# Patient Record
Sex: FEMALE | Race: BLACK OR AFRICAN AMERICAN
[De-identification: names, ages, dates, MRNs, and addresses within clinical notes are randomized per-mention and may not be internally consistent; named-entity substitution may affect disease eponyms.]

---

## 2020-08-29 ENCOUNTER — HOSPITAL ENCOUNTER (EMERGENCY)
Dept: HOSPITAL 56 - MW.ED | Age: 30
Discharge: HOME | End: 2020-08-29
Payer: COMMERCIAL

## 2020-08-29 VITALS — DIASTOLIC BLOOD PRESSURE: 88 MMHG | HEART RATE: 78 BPM | SYSTOLIC BLOOD PRESSURE: 140 MMHG

## 2020-08-29 DIAGNOSIS — O21.9: Primary | ICD-10-CM

## 2020-08-29 DIAGNOSIS — O10.911: ICD-10-CM

## 2020-08-29 DIAGNOSIS — Z79.899: ICD-10-CM

## 2020-08-29 DIAGNOSIS — Z91.013: ICD-10-CM

## 2020-08-29 LAB
BUN SERPL-MCNC: 11 MG/DL (ref 7–18)
CHLORIDE SERPL-SCNC: 101 MMOL/L (ref 98–107)
CO2 SERPL-SCNC: 25.1 MMOL/L (ref 21–32)
GLUCOSE SERPL-MCNC: 100 MG/DL (ref 74–106)
POTASSIUM SERPL-SCNC: 3.9 MMOL/L (ref 3.5–5.1)
SODIUM SERPL-SCNC: 136 MMOL/L (ref 136–145)

## 2020-08-29 PROCEDURE — 36415 COLL VENOUS BLD VENIPUNCTURE: CPT

## 2020-08-29 PROCEDURE — 80053 COMPREHEN METABOLIC PANEL: CPT

## 2020-08-29 PROCEDURE — 99284 EMERGENCY DEPT VISIT MOD MDM: CPT

## 2020-08-29 PROCEDURE — 96374 THER/PROPH/DIAG INJ IV PUSH: CPT

## 2020-08-29 PROCEDURE — 85025 COMPLETE CBC W/AUTO DIFF WBC: CPT

## 2020-08-29 PROCEDURE — 96361 HYDRATE IV INFUSION ADD-ON: CPT

## 2020-08-29 PROCEDURE — 81001 URINALYSIS AUTO W/SCOPE: CPT

## 2020-08-29 NOTE — EDM.PDOC
ED HPI GENERAL MEDICAL PROBLEM





- General


Chief Complaint: Gastrointestinal Problem


Stated Complaint: FEELS WEEK


Time Seen by Provider: 20 02:47


Source of Information: Reports: Patient


History Limitations: Reports: No Limitations





- History of Present Illness


INITIAL COMMENTS - FREE TEXT/NARRATIVE: 





29-year-old female with history of hypertension, A2, GA 4 weeks presents 

feeling nauseous and vomiting for 2 days.  She feels like she wants to pass out.

 She has had 6 episodes of nonbloody vomitus over the last 2 days.  She denies 

fever, chills, abdominal pain, vaginal bleeding, pelvic pain, dysuria, chest 

pain, shortness of breath, palpitations, back pain, sore throat, runny nose, 

headache.  She does admit to having chills.  She has yet to see [facility]

for her pregnancy.  She has not taking any medications for her vomiting.





ROS: A 10-point review of systems, other than pertinent positives and negatives 

as stated per HPI, is otherwise negative





Past medical history: No additional pertinent history


Past Surgical history: No additional pertinent history


Social history: No additional pertinent history


Family history: No additional pertinent history


________________________________________________________________________________





PHYSICAL EXAM





General: AOx4, GCS = 15, No distress


HEENT: dry mucous membrane


Neck: supple, no meningismus, no Kernig or Brudzinski


Cardiac: S1S2 RRR


Respiratory: CTAB, no crackles or rales, no wheezing


Abdomen: Soft, nontender, no rebound or guarding, nondistended, no pulsatile 

mass.


Back: nontender


Musculoskeletal: NVI distally, no deformity


Neuro: No focal deficits, CN 2 - 12 WNL.











- Related Data


                                    Allergies











Allergy/AdvReac Type Severity Reaction Status Date / Time


 


seafoods Allergy  Swelling Uncoded 20 03:08











Home Meds: 


                                    Home Meds





Acetaminophen [Tylenol Extra Strength] 500 mg PO Q4H PRN #30 tab 14 [Rx]


Losartan/Hydrochlorothiazide [Losartan-HCTZ 50-12.5 MG] 1 mg PO DAILY 20 

[History]


Ondansetron [Zofran ODT] 4 mg PO Q6H PRN #12 tab.dis 20 [Rx]











Past Medical History





- Past Health History


Medical/Surgical History: Denies Medical/Surgical History


Cardiovascular History: Reports: Hypertension





Social & Family History





- Tobacco Use


Smoking Status *Q: Never Smoker


Second Hand Smoke Exposure: No





- Caffeine Use


Caffeine Use: Reports: None





ED ROS GENERAL





- Review of Systems


Review Of Systems: Comprehensive ROS is negative, except as noted in HPI.





ED EXAM PREGNANCY





- Physical Exam


Exam: See Below (see dictation)





ED DELIVERY OF 





- Related Past History


Related Past History: HTN





Course





- Vital Signs


Last Recorded V/S: 


                                Last Vital Signs











Temp  97.8 F   20 03:05


 


Pulse  77   20 04:06


 


Resp  16   20 04:06


 


BP  140/80   20 04:06


 


Pulse Ox  98   20 04:06








                                        





Orthostatic Blood Pressure [     143/101


Standing]                        


Orthostatic Blood Pressure [     156/88


Sitting]                         


Orthostatic Blood Pressure [     147/97


Supine]                          











- Orders/Labs/Meds


Orders: 


                               Active Orders 24 hr











 Category Date Time Status


 


 Cardiac Monitoring [RC] .AS DIRECTED Care  20 02:48 Active


 


 Orthostatic Vital Signs [RC] ASDIRECTED Care  20 02:48 Active


 


 Pulse Oximetry [RC] ASDIRECTED Care  20 02:48 Active











Labs: 


                                Laboratory Tests











  20 Range/Units





  03:15 03:15 04:20 


 


WBC  14.12 H    (4.0-11.0)  K/uL


 


RBC  4.66    (4.30-5.90)  M/uL


 


Hgb  12.1    (12.0-16.0)  g/dL


 


Hct  33.8 L    (36.0-46.0)  %


 


MCV  72.5 L    (80.0-98.0)  fL


 


MCH  26.0 L    (27.0-32.0)  pg


 


MCHC  35.8    (31.0-37.0)  g/dL


 


RDW Std Deviation  42.6    (28.0-62.0)  fl


 


RDW Coeff of Tenisha  16 H    (11.0-15.0)  %


 


Plt Count  285    (150-400)  K/uL


 


MPV  10.30    (7.40-12.00)  fL


 


Neut % (Auto)  73.9    (48.0-80.0)  %


 


Lymph % (Auto)  17.9    (16.0-40.0)  %


 


Mono % (Auto)  6.9    (0.0-15.0)  %


 


Eos % (Auto)  1.0    (0.0-7.0)  %


 


Baso % (Auto)  0.3    (0.0-1.5)  %


 


Neut # (Auto)  10.4 H    (1.4-5.7)  K/uL


 


Lymph # (Auto)  2.5 H    (0.6-2.4)  K/uL


 


Mono # (Auto)  1.0 H    (0.0-0.8)  K/uL


 


Eos # (Auto)  0.1    (0.0-0.7)  K/uL


 


Baso # (Auto)  0.0    (0.0-0.1)  K/uL


 


Nucleated RBC %  0.0    /100WBC


 


Nucleated RBCs #  0    K/uL


 


Sodium   136   (136-145)  mmol/L


 


Potassium   3.9   (3.5-5.1)  mmol/L


 


Chloride   101   ()  mmol/L


 


Carbon Dioxide   25.1   (21.0-32.0)  mmol/L


 


BUN   11   (7.0-18.0)  mg/dL


 


Creatinine   0.9   (0.6-1.0)  mg/dL


 


Est Cr Clr Drug Dosing   82.25   mL/min


 


Estimated GFR (MDRD)   > 60.0   ml/min


 


Glucose   100   ()  mg/dL


 


Calcium   9.9   (8.5-10.1)  mg/dL


 


Total Bilirubin   0.3   (0.2-1.0)  mg/dL


 


AST   9 L   (15-37)  IU/L


 


ALT   14   (14-63)  IU/L


 


Alkaline Phosphatase   62   ()  U/L


 


Total Protein   7.5   (6.4-8.2)  g/dL


 


Albumin   3.6   (3.4-5.0)  g/dL


 


Globulin   3.9   (2.6-4.0)  g/dL


 


Albumin/Globulin Ratio   0.9   (0.9-1.6)  


 


Urine Color    YELLOW  


 


Urine Appearance    CLEAR  


 


Urine pH    6.5  (5.0-8.0)  


 


Ur Specific Gravity    1.010  (1.001-1.035)  


 


Urine Protein    NEGATIVE  (NEGATIVE)  mg/dL


 


Urine Glucose (UA)    NEGATIVE  (NEGATIVE)  mg/dL


 


Urine Ketones    NEGATIVE  (NEGATIVE)  mg/dL


 


Urine Occult Blood    NEGATIVE  (NEGATIVE)  


 


Urine Nitrite    NEGATIVE  (NEGATIVE)  


 


Urine Bilirubin    NEGATIVE  (NEGATIVE)  


 


Urine Urobilinogen    0.2  (<2.0)  EU/dL


 


Ur Leukocyte Esterase    NEGATIVE  (NEGATIVE)  


 


Urine RBC    0-1  (0-2/HPF)  


 


Urine WBC    0-1  (0-5/HPF)  


 


Ur Epithelial Cells    RARE  (NONE-FEW)  


 


Urine Bacteria    RARE  (NEGATIVE)  











Meds: 


Medications














Discontinued Medications














Generic Name Dose Route Start Last Admin





  Trade Name Freq  PRN Reason Stop Dose Admin


 


Lactated Ringer's  1,000 mls @ 999 mls/hr  20 03:21  20 03:30





  Ringers, Lactated  IV  20 04:21  999 mls/hr





  .BOLUS ONE   Administration


 


Ondansetron HCl  4 mg  20 03:21  20 03:30





  Zofran  IVPUSH  20 03:22  4 mg





  ONETIME ONE   Administration














- Re-Assessments/Exams


Free Text/Narrative Re-Assessment/Exam: 





20 04:18


I reassessed the patient, who is feeling much better after IV fluids and Zofran.

 Their vitals are stable at this time. Their MAP = 100, shock index between 0.6,

 which is normal.





20 04:50


After IV fluids in the ER, she improved and is currently stable for discharge.  

She has not vomited in the ER.  I performed a repeat exam and did not appreciate

 new abnormal findings. Patient exhibits normal vital signs and has a normal 

gait on road test. I advised the patient to return to the ER for reevaluation if

 symptoms worsened, including fever, worsening pain, or any other worrisome 

symptoms. I instructed the patient to follow up with her OB/GYN within 2-3 days.





________________________________________________________________________________





MEDICAL DECISION MAKING: I reviewed the patients past medical records, lab and 

radiographic findings. I discussed the case with the patient. My differential 

diagnosis included: Dehydration, electrolyte abnormality, hyperemesis 

gravidarum, UTI.  Patient exhibited leukocytosis, likely secondary to acute 

stress from vomiting.  She has no fever, tachycardia or other signs of SIRS. Her

 urine did not show a UTI. Her MAP after IV fluids = 100, her shock index is 

0.6, which is within normal range.  I do not suspect need for further work-up at

 this time.




















Departure





- Departure


Time of Disposition: 04:50


Disposition: Home, Self-Care 01


Condition: Good


Clinical Impression: 


 Vomiting, First trimester pregnancy








- Discharge Information


*PRESCRIPTION DRUG MONITORING PROGRAM REVIEWED*: Not Applicable


*COPY OF PRESCRIPTION DRUG MONITORING REPORT IN PATIENT ROBERT: Not Applicable


Prescriptions: 


Ondansetron [Zofran ODT] 4 mg PO Q6H PRN #12 tab.dis


 PRN Reason: Vomiting


Instructions:  First Trimester of Pregnancy, Easy-to-Read, Nausea and Vomiting, 

Adult, Easy-to-Read


Referrals: 


Checo Finnegan MD [Physician] - 2 Weeks


Forms:  ED Department Discharge


Additional Instructions: 


The need for follow-up, as well as the timing and circumstances, are variable 

depending upon the specifics of your emergency department visit.





If you don't have a primary care physician on staff, we will provide you with a 

referral. We always advise you to contact your personal physician following an 

emergency department visit to inform them of the circumstance of the visit and 

for follow-up with them and/or the need for any referrals to a consulting 

specialist.





The emergency department will also refer you to a specialist when appropriate. 

This referral assures that you have the opportunity for follow-up care with a 

specialist. All of these measure are taken in an effort to provide you with 

optimal care, which includes your follow-up.





Under all circumstances we always encourage you to contact your private 

physician who remains a resource for coordinating your care. When calling for 

follow-up care, please make the office aware that this follow-up is from your 

recent emergency room visit. If for any reason you are refused follow-up, please

 contact the [facility] Emergency 

Department at [phone] and asked to speak to the emergency department 

charge nurse.








OB/GYN clinics


[facility]


[address] [place] [address]


Phone: [phone]


Fax: [phone]





Sepsis Event Note (ED)





- Evaluation


Sepsis Screening Result: No Definite Risk





- Focused Exam


Vital Signs: 


                                   Vital Signs











  Temp Pulse Resp BP Pulse Ox


 


 20 04:06   77  16  140/80  98


 


 20 03:05  97.8 F  83  20  147/97 H  98














- My Orders


Last 24 Hours: 


My Active Orders





20 02:48


Cardiac Monitoring [RC] .AS DIRECTED 


Orthostatic Vital Signs [RC] ASDIRECTED 


Pulse Oximetry [RC] ASDIRECTED 














- Assessment/Plan


Last 24 Hours: 


My Active Orders





20 02:48


Cardiac Monitoring [RC] .AS DIRECTED 


Orthostatic Vital Signs [RC] ASDIRECTED 


Pulse Oximetry [RC] ASDIRECTED

## 2020-10-22 ENCOUNTER — HOSPITAL ENCOUNTER (OUTPATIENT)
Dept: HOSPITAL 56 - MW.SDS | Age: 30
Discharge: HOME | End: 2020-10-22
Attending: OBSTETRICS & GYNECOLOGY
Payer: COMMERCIAL

## 2020-10-22 VITALS — DIASTOLIC BLOOD PRESSURE: 82 MMHG | HEART RATE: 105 BPM | SYSTOLIC BLOOD PRESSURE: 146 MMHG

## 2020-10-22 DIAGNOSIS — O98.312: ICD-10-CM

## 2020-10-22 DIAGNOSIS — E66.09: ICD-10-CM

## 2020-10-22 DIAGNOSIS — O34.32: Primary | ICD-10-CM

## 2020-10-22 DIAGNOSIS — Z79.899: ICD-10-CM

## 2020-10-22 DIAGNOSIS — I10: ICD-10-CM

## 2020-10-22 DIAGNOSIS — Z3A.16: ICD-10-CM

## 2020-10-22 PROCEDURE — 86900 BLOOD TYPING SEROLOGIC ABO: CPT

## 2020-10-22 PROCEDURE — 36415 COLL VENOUS BLD VENIPUNCTURE: CPT

## 2020-10-22 PROCEDURE — 85027 COMPLETE CBC AUTOMATED: CPT

## 2020-10-22 PROCEDURE — 86850 RBC ANTIBODY SCREEN: CPT

## 2020-10-22 PROCEDURE — 59320 REVISION OF CERVIX: CPT

## 2020-10-22 PROCEDURE — 86901 BLOOD TYPING SEROLOGIC RH(D): CPT

## 2020-10-22 NOTE — PCM.POSTAN
POST ANESTHESIA ASSESSMENT





- MENTAL STATUS


Mental Status: Alert, Oriented





- VITAL SIGNS


Vital Signs: 


                                Last Vital Signs











Temp  37 C   10/22/20 10:46


 


Pulse  85   10/22/20 11:01


 


Resp  18   10/22/20 11:01


 


BP  142/78 H  10/22/20 11:01


 


Pulse Ox  98   10/22/20 11:01














- RESPIRATORY


Respiratory Status: Respiratory Rate WNL, Airway Patent, O2 Saturation Stable





- CARDIOVASCULAR


CV Status: Pulse Rate WNL, Blood Pressure Stable





- GASTROINTESTINAL


GI Status: No Symptoms





- PAIN


Pain Score: 4





- POST OP HYDRATION


Hydration Status: Adequate & Stable





- OBSERVATIONS


Free Text/Narrative:: 





No anesthesia problems

## 2020-10-22 NOTE — PCM.PREANE
Preanesthetic Assessment





- Anesthesia/Transfusion/Family Hx


Anesthesia History: Prior Anesthesia Without Reaction


Family History of Anesthesia Reaction: No


Transfusion History: No Prior Transfusion(s)


Intubation History: Unknown





- Review of Systems


General: No Symptoms


Pulmonary: No Symptoms


Cardiovascular: No Symptoms


Gastrointestinal: No Symptoms


Neurological: No Symptoms


Other: Reports: None





- Physical Assessment


Height: 5 ft 3 in


Weight: 104.78 kg


ASA Class: 2


Mental Status: Alert & Oriented x3


Airway Class: Mallampati = 1


Dentition: Reports: Normal Dentition


Thyro-Mental Finger Breadths: 3


Mouth Opening Finger Breadths: 3


ROM/Head Extension: Full


Lungs: Clear to Auscultation, Normal Respiratory Effort


Cardiovascular: Regular Rate, Regular Rhythm





- Allergies


Allergies/Adverse Reactions: 


                                    Allergies











Allergy/AdvReac Type Severity Reaction Status Date / Time


 


seafoods Allergy  Swelling Uncoded 10/19/20 08:55














- Blood


Blood Available: No





- Anesthesia Plan


Pre-Op Medication Ordered: None





- Acknowledgements


Anesthesia Type Planned: Spinal (saddle block)


Pt an Appropriate Candidate for the Planned Anesthesia: Yes


Alternatives and Risks of Anesthesia Discussed w Pt/Guardian: Yes


Pt/Guardian Understands and Agrees with Anesthesia Plan: Yes





PreAnesthesia Questionnaire





- Past Health History


Medical/Surgical History: Denies Medical/Surgical History


HEENT History: Reports: None


Cardiovascular History: Reports: Hypertension


Respiratory History: Reports: None


Gastrointestinal History: Reports: None


Genitourinary History: Reports: None


OB/GYN History: Reports: Pregnancy (15 weeks pregnant- incompetent cervix)


Musculoskeletal History: Reports: None


Neurological History: Reports: None


Psychiatric History: Reports: None


Endocrine/Metabolic History: Reports: Obesity/BMI 30+ (BMI 40.9)


Hematologic History: Reports: None


Immunologic History: Reports: None


Dermatologic History: Reports: None





- Infectious Disease History


Infectious Disease History: Reports: None





- Past Surgical History


Head Surgeries/Procedures: Reports: None


HEENT Surgical History: Reports: None


Cardiovascular Surgical History: Reports: None


Respiratory Surgical History: Reports: None


GI Surgical History: Reports: None


Female  Surgical History: Reports: None


Musculoskeletal Surgical History: Reports: Other (See Below)


Other Musculoskeletal Surgeries/Procedures:: states had an abcess from leg 

removed surgically





- SUBSTANCE USE


Tobacco Use Status *Q: Never Tobacco User





- HOME MEDS


Home Medications: 


                                    Home Meds





Acetaminophen [Tylenol Extra Strength] 500 mg PO Q4H PRN #30 tab 09/09/14 [Rx]


Losartan/Hydrochlorothiazide [Losartan-HCTZ 50-12.5 MG] 1 mg PO DAILY 08/29/20 

[History]


Ondansetron [Zofran ODT] 4 mg PO Q6H PRN #12 tab.dis 08/29/20 [Rx]


Mv-Mn/Iron/FA/Herbal/Digestive [Prenatal One Tablet] 1 tab PO DAILY 10/19/20 

[History]











- CURRENT (IN HOUSE) MEDS


Current Meds: 





                               Current Medications





Lactated Ringer's (Ringers, Lactated)  1,000 mls @ 125 mls/hr IV ASDIRECTED CARLOS A





Discontinued Medications





Ondansetron HCl (Zofran) Confirm Administered Dose 4 mg .ROUTE .STK-MED ONE


   Stop: 10/22/20 08:36

## 2020-10-22 NOTE — PCM48HPAN
Post Anesthesia Note





- EVALUATION WITHIN 48HRS OF ANESTHETIC


Vital Signs in Normal Range: Yes


Patient Participated in Evaluation: Yes


Respiratory Function Stable: Yes


Airway Patent: Yes


Cardiovascular Function Stable: Yes


Hydration Status Stable: Yes


Pain Control Satisfactory: Yes


Nausea and Vomiting Control Satisfactory: Yes


Mental Status Recovered: Yes


Vital Signs: 


                                Last Vital Signs











Temp  37 C   10/22/20 10:46


 


Pulse  105 H  10/22/20 13:38


 


Resp  15   10/22/20 13:38


 


BP  146/82 H  10/22/20 13:38


 


Pulse Ox  99   10/22/20 13:38














- COMMENTS/OBSERVATIONS


Free Text/Narrative:: 





No anesthesia problems

## 2020-10-22 NOTE — PCM.DCSUM1
**Discharge Summary





- Hospital Course


Diagnosis: Stroke: No





- Discharge Data


Discharge Date: 10/22/20


Discharge Disposition: Home, Self-Care 01


Condition: Good





- Referral to Home Health


Primary Care Physician: 


Checo Finnegan MD








- Patient Summary/Data


Operative Procedure(s) Performed: Harinder Garcia





- Patient Instructions


Diet: Usual Diet as Tolerated


Activity: As Tolerated


Showering/Bathing: May Shower





- Discharge Plan


Home Medications: 


                                    Home Meds





Acetaminophen [Tylenol Extra Strength] 500 mg PO Q4H PRN #30 tab 09/09/14 [Rx]


Losartan/Hydrochlorothiazide [Losartan-HCTZ 50-12.5 MG] 1 mg PO DAILY 08/29/20 

[History]


Ondansetron [Zofran ODT] 4 mg PO Q6H PRN #12 tab.dis 08/29/20 [Rx]


Mv-Mn/Iron/FA/Herbal/Digestive [Prenatal One Tablet] 1 tab PO DAILY 10/19/20 

[History]











- Discharge Summary/Plan Comment


DC Time >30 min.: Yes





- General Info


Date of Service: 10/22/20


Functional Status: Reports: Pain Controlled





- Review of Systems


General: Reports: No Symptoms


HEENT: Reports: No Symptoms


Pulmonary: Reports: No Symptoms


Cardiovascular: Reports: No Symptoms


Gastrointestinal: Reports: No Symptoms


Genitourinary: Reports: No Symptoms


Musculoskeletal: Reports: No Symptoms


Skin: Reports: No Symptoms


Neurological: Reports: No Symptoms


Psychiatric: Reports: No Symptoms





- Patient Data


Vitals - Most Recent: 


                                Last Vital Signs











Temp  36.6 C   10/22/20 08:45


 


Pulse  113 H  10/22/20 08:45


 


Resp  15   10/22/20 08:45


 


BP  146/92 H  10/22/20 08:45


 


Pulse Ox  98   10/22/20 08:45











Weight - Most Recent: 104.78 kg


Lab Results - Last 24 hrs: 


                         Laboratory Results - last 24 hr











  10/22/20 10/22/20 Range/Units





  08:41 08:41 


 


WBC  12.96 H   (4.0-11.0)  K/uL


 


RBC  4.72   (4.30-5.90)  M/uL


 


Hgb  12.5   (12.0-16.0)  g/dL


 


Hct  35.4 L   (36.0-46.0)  %


 


MCV  75.0 L   (80.0-98.0)  fL


 


MCH  26.5 L   (27.0-32.0)  pg


 


MCHC  35.3   (31.0-37.0)  g/dL


 


RDW Std Deviation  41.6   (28.0-62.0)  fl


 


RDW Coeff of Tenisha  15   (11.0-15.0)  %


 


Plt Count  266   (150-400)  K/uL


 


MPV  10.70   (7.40-12.00)  fL


 


Nucleated RBC %  0.0   /100WBC


 


Nucleated RBCs #  0   K/uL


 


Blood Type   O POSITIVE  


 


Antibody Screen   NEGATIVE  











Med Orders - Current: 


                               Current Medications





Lactated Ringer's (Ringers, Lactated)  1,000 mls @ 125 mls/hr IV ASDIRECTED CARLOS A


   Last Admin: 10/22/20 08:40 Dose:  125 mls/hr


   Documented by: 





Discontinued Medications





Fentanyl (Sublimaze) Confirm Administered Dose 100 mcg .ROUTE .STK-MED ONE


   Stop: 10/22/20 10:37


Ondansetron HCl (Zofran) Confirm Administered Dose 4 mg .ROUTE .STK-MED ONE


   Stop: 10/22/20 08:36


Propofol (Diprivan  20 Ml) Confirm Administered Dose 200 mg .ROUTE .STK-MED ONE


   Stop: 10/22/20 10:40











- Exam


General: Reports: Alert, Oriented


HEENT: Reports: Pupils Equal, Pupils Reactive, EOMI, Mucous Membr. Moist/Pink


Neck: Reports: Supple


Lungs: Reports: Clear to Auscultation, Normal Respiratory Effort


Cardiovascular: Reports: Regular Rate, Regular Rhythm


GI/Abdominal Exam: Normal Bowel Sounds, Soft, Non-Tender, No Organomegaly, No 

Distention, No Abnormal Bruit, No Mass, Pelvis Stable


 (Female) Exam: Normal External Exam, Normal Speculum Exam, Normal Bimanual 

Exam


Rectal (Female) Exam: Normal Exam, Normal Rectal Tone


Back Exam: Reports: Normal Inspection, Full Range of Motion


Extremities: Normal Inspection, Normal Range of Motion, Non-Tender, No Pedal 

Edema, Normal Capillary Refill


Skin: Reports: Warm, Dry, Intact


Wound/Incisions: Reports: Healing Well


Neurological: Reports: No New Focal Deficit


Psy/Mental Status: Reports: Alert, Normal Affect, Normal Mood

## 2020-10-22 NOTE — PCM.OPNOTE
- General Post-Op/Procedure Note


Date of Surgery/Procedure: 10/22/20


Operative Procedure(s): Pizano Cerclage


Pre Op Diagnosis: Cevical incomptance


Post-Op Diagnosis: Same


Anesthesia Technique: Spinal


Primary Surgeon: Checo Finnegan


EBL in mLs: 20


Complications: None


Condition: Good

## 2020-10-23 NOTE — OR
SURGEON:

Checo Finnegan MD

 

DATE OF PROCEDURE:  10/22/2020

 

PREOPERATIVE DIAGNOSIS:

Intrauterine pregnancy 16 weeks, cervical incompetence.

 

POSTOPERATIVE DIAGNOSIS:

Intrauterine pregnancy 16 weeks, cervical incompetence.

 

OPERATION PERFORMED:

Barnard cerclage.

 

PRIMARY SURGEON:

Checo Finnegan MD

 

ASSISTANT:

Rain Conway.

 

ANESTHESIA:

Spinal, Pasha Handy and Dr. Salguero.

 

ESTIMATED BLOOD LOSS:

Less than 20 mL.

 

COMPLICATIONS:

None.

 

FINDINGS:

Cervical incompetence.

 

INDICATION FOR SURGERY:

[place] referred to the admit note.

 

PROCEDURE IN DETAIL:

The patient was brought to the OR, properly identified, and after adequate level

of spinal anesthesia, the patient was placed in lithotomy position, prepped and

draped in sterile fashion as usual.  Straight catheter was used to empty the

bladder and then weighted speculum placed in the vagina.  Two ring forceps were

applied to the anterior and posterior lips of the cervix and using Mersilene

band, Barnard cerclage in a circular fashion passed around the cervix and tied

with due amount of tension without too much tension.  Once we accomplished that,

there was no bleeding.  The procedure ended.  The instrument and sponge count

were correct.  The patient tolerated the procedure well, went to recovery room

in stable general condition.

 

 

ALICIA / BRIAN

DD:  10/22/2020 10:54:55

DT:  10/22/2020 13:06:36

Job #:  [id number]

## 2021-03-26 ENCOUNTER — HOSPITAL ENCOUNTER (INPATIENT)
Dept: HOSPITAL 56 - MW.OBCHECK | Age: 31
LOS: 1 days | Discharge: HOME | DRG: 560 | End: 2021-03-27
Attending: OBSTETRICS & GYNECOLOGY | Admitting: OBSTETRICS & GYNECOLOGY
Payer: COMMERCIAL

## 2021-03-26 DIAGNOSIS — Z20.822: ICD-10-CM

## 2021-03-26 DIAGNOSIS — Z91.013: ICD-10-CM

## 2021-03-26 DIAGNOSIS — Z3A.37: ICD-10-CM

## 2021-03-26 PROCEDURE — U0002 COVID-19 LAB TEST NON-CDC: HCPCS

## 2021-03-26 PROCEDURE — 3E0R3BZ INTRODUCTION OF ANESTHETIC AGENT INTO SPINAL CANAL, PERCUTANEOUS APPROACH: ICD-10-PCS | Performed by: OBSTETRICS & GYNECOLOGY

## 2021-03-26 PROCEDURE — 10907ZC DRAINAGE OF AMNIOTIC FLUID, THERAPEUTIC FROM PRODUCTS OF CONCEPTION, VIA NATURAL OR ARTIFICIAL OPENING: ICD-10-PCS | Performed by: OBSTETRICS & GYNECOLOGY

## 2021-03-26 PROCEDURE — 3E0P7VZ INTRODUCTION OF HORMONE INTO FEMALE REPRODUCTIVE, VIA NATURAL OR ARTIFICIAL OPENING: ICD-10-PCS | Performed by: OBSTETRICS & GYNECOLOGY

## 2021-03-26 NOTE — PCM.LDHP
L&D History of Present Illness





- General


Date of Service: 21


Admit Problem/Dx: 


                   Patient Status Order with Admit Dx/Problem





21 00:59


Patient Status [ADT] Routine 








                           Admission Diagnosis/Problem











Admission Diagnosis/Problem    Pregnancy














21 03:38


 presenting to L&D at 37 4/7 weeks for induction of labor due to a 

pregnancy complicated by chronic hypertension and history of incompetent cervix.

  She had a cerclage placed on 10/22/20; it was removed in-office by Dr. Finnegan 

on 21.  Her hypertension is currently managed with 60 mg nifedipine PO 

daily and hydrochlorothiazide 12.5mg PO daily.  Growth ultrasound on 02/15/21 

noted EFW 2224 grams (91st percentile).  She has had weekly NSTs since 32 weeks.

 O+, Rubella immune, GBS negative


21 03:56





Source of Information: Patient


History Limitations: Reports: No Limitations





- Related Data


Allergies/Adverse Reactions: 


                                    Allergies











Allergy/AdvReac Type Severity Reaction Status Date / Time


 


shellfish derived Allergy  Airway Verified 21 01:55





   Tightness  


 


seafoods Allergy  Swelling Uncoded 10/19/20 08:55











Home Medications: 


                                    Home Meds





Acetaminophen [Tylenol Extra Strength] 500 mg PO Q4H PRN #30 tab 14 [Rx]


Mv-Mn/Iron/FA/Herbal/Digestive [Prenatal One Tablet] 1 tab PO DAILY 10/19/20 

[History]


hydroCHLOROthiazide [Hydrochlorothiazide] 1 cap PO DAILY 21 [History]











Past Medical History





- Past Health History


Medical/Surgical History: Denies Medical/Surgical History


HEENT History: Reports: None


Cardiovascular History: Reports: Hypertension


Respiratory History: Reports: None


Gastrointestinal History: Reports: None


Genitourinary History: Reports: None


OB/GYN History: Reports: Pregnancy (15 weeks pregnant- incompetent cervix)


Musculoskeletal History: Reports: None


Neurological History: Reports: None


Psychiatric History: Reports: None


Endocrine/Metabolic History: Reports: Obesity/BMI 30+ (BMI 40.9)


Hematologic History: Reports: None


Immunologic History: Reports: None


Dermatologic History: Reports: None





- Infectious Disease History


Infectious Disease History: Reports: None





- Past Surgical History


Head Surgeries/Procedures: Reports: None


HEENT Surgical History: Reports: None


Cardiovascular Surgical History: Reports: None


Respiratory Surgical History: Reports: None


GI Surgical History: Reports: None


Female  Surgical History: Reports: None


Musculoskeletal Surgical History: Reports: Other (See Below)


Other Musculoskeletal Surgeries/Procedures:: states had an abcess from leg 

removed surgically





Social & Family History





- Caffeine Use


Caffeine Use: Reports: None





H&P Review of Systems





- Review of Systems:


Review Of Systems: See Below


General: Reports: No Symptoms


HEENT: Reports: No Symptoms


Pulmonary: Reports: No Symptoms


Cardiovascular: Reports: No Symptoms


Gastrointestinal: Reports: No Symptoms


Genitourinary: Reports: No Symptoms


Musculoskeletal: Reports: No Symptoms


Skin: Reports: No Symptoms


Psychiatric: Reports: No Symptoms


Neurological: Reports: No Symptoms


Hematologic/Lymphatic: Reports: No Symptoms


Immunologic: Reports: No Symptoms





L&D Exam





- Exam


Exam: See Below





- Vital Signs


Weight: 262 lb





- OB Specific


Fetal Movement: Active


Fetal Heart Tones: Present


Fetal Heart Rate (FHR) Variability: Moderate (6-25 bmp)


Birth Presentation: Vertex





- Javed Score


Javed Score Cervix Position: Posterior


Javed Score Consistency: Medium


Javed Score Effacement: 31-50%


Javed Score Dilation: 3-4 cm


Javed Score Infant's Station: -2


Javed Score Total: 5





- Exam


General: Alert, Oriented


Lungs: Normal Respiratory Effort


Cardiovascular: Regular Rate, Regular Rhythm


GI/Abdominal Exam: Soft, Non-Tender


Rectal Exam: Deferred


Genitourinary: Deferred


Back Exam: Normal Inspection, Full Range of Motion


Extremities: Normal Inspection, Normal Range of Motion, Non-Tender, Normal 

Capillary Refill


Skin: Warm, Dry, Intact


Neurological: Strength Equal Bilateral, Normal Speech, Normal Tone, Sensation 

Intact


Psychiatric: Alert, Normal Affect, Normal Mood





- Patient Data


Lab Results Last 24 hrs: 


                         Laboratory Results - last 24 hr











  21 Range/Units





  00:50 00:50 01:15 


 


WBC  9.17    (4.0-11.0)  K/uL


 


RBC  4.91    (4.30-5.90)  M/uL


 


Hgb  13.0    (12.0-16.0)  g/dL


 


Hct  36.2    (36.0-46.0)  %


 


MCV  73.7 L    (80.0-98.0)  fL


 


MCH  26.5 L    (27.0-32.0)  pg


 


MCHC  35.9    (31.0-37.0)  g/dL


 


RDW Std Deviation  39.0    (28.0-62.0)  fl


 


RDW Coeff of Tenisha  15    (11.0-15.0)  %


 


Plt Count  290    (150-400)  K/uL


 


MPV  12.20 H    (7.40-12.00)  fL


 


Nucleated RBC %  0.0    /100WBC


 


Nucleated RBCs #  0    K/uL


 


SARS-CoV-2 RNA (MICHELLE)    NEGATIVE  (NEGATIVE)  


 


Blood Type   O POSITIVE   


 


Antibody Screen   NEGATIVE   











Result Diagrams: 


                                 21 00:50








- Problem List


(1) Supervision of normal IUP (intrauterine pregnancy) in multigravida


SNOMED Code(s): 874397539, 094265846, 554258179


   ICD Code: Z34.80 - ENCOUNTER FOR SUPRVSN OF NORMAL PREGNANCY, UNSP TRIMESTER 

  Status: Acute   Priority: High   Current Visit: Yes   


Qualifiers: 


   Trimester: third trimester   Qualified Code(s): Z34.83 - Encounter for 

supervision of other normal pregnancy, third trimester   





(2) Chronic hypertension affecting pregnancy


SNOMED Code(s): 72484677


   ICD Code: O10.919 - UNSP PRE-EXISTING HTN COMP PREGNANCY, UNSP TRIMESTER   

Status: Acute   Priority: High   Current Visit: Yes   


Problem List Initiated/Reviewed/Updated: Yes


Orders Last 24hrs: 


                               Active Orders 24 hr











 Category Date Time Status


 


 Patient Status [ADT] Routine ADT  21 00:59 Active


 


 Bedrest Bathroom Privileges [RC] ASDIRECTED Care  21 00:59 Active


 


 Communication Order [RC] ASDIRECTED Care  21 00:59 Active


 


 Communication Order [RC] ASDIRECTED Care  21 00:59 Active


 


 Communication Order [RC] ASDIRECTED Care  21 00:59 Active


 


 Fetal Heart Tones [RC] CONTINUOUS Care  21 00:59 Active


 


 Fetal Non Stress Test [RC] PER UNIT ROUTINE Care  21 00:59 Active


 


 May Shower [RC] ASDIRECTED Care  21 00:59 Active


 


 Notify Provider [RC] PRN Care  21 00:59 Active


 


 Notify Provider [RC] PRN Care  21 00:59 Active


 


 Notify Provider [RC] PRN Care  21 00:59 Active


 


 Notify Provider [RC] STAT Care  21 00:59 Active


 


 Oxygen Therapy [RC] ASDIRECTED Care  21 00:59 Active


 


 Peripheral IV Care [RC] PRN Care  21 00:59 Active


 


 Up ad Brenda [RC] ASDIRECTED Care  21 00:59 Active


 


 Vaginal Exam [RC] PRN Care  21 00:59 Active


 


 Vital Signs [RC] PER UNIT ROUTINE Care  21 00:59 Active


 


 Regular Diet [DIET] Diet  21 Breakfast Active


 


 RPR (SYPHILIS SERO) W/ RFLX [REF] Routine Lab  21 00:50 Received


 


 Ampicillin 1 gm Med  21 05:30 Active





 Sodium Chloride 0.9% [Normal Saline] 50 ml   





 IV Q4H   


 


 Butorphanol [Stadol] Med  21 00:59 Active





 1 mg IVPUSH Q1H PRN   


 


 Carboprost Tromethamine [Hemabate DS] Med  21 00:59 Active





 250 mcg IM ASDIRECTED PRN   


 


 Lactated Ringers [Ringers, Lactated] 1,000 ml Med  21 01:00 Active





 IV ASDIRECTED   


 


 Lidocaine 1% [Xylocaine 1%] Med  21 00:59 Active





 50 ml INJECT ONETIME PRN   


 


 Methylergonovine [Methergine] Med  21 00:59 Active





 0.2 mg IM ASDIRECTED PRN   


 


 Nalbuphine [Nubain] Med  21 00:59 Active





 10 mg IVPUSH Q1H PRN   


 


 Ondansetron [Zofran] Med  21 00:59 Active





 4 mg IVPUSH Q6H PRN   


 


 Oxytocin/0.9 % Sodium Chloride [Oxytocin 30 Unit/500 ML Med  21 01:00 

Active





 -NS]   





 30 unit in 500 ml IV TITRATE   


 


 Oxytocin/0.9 % Sodium Chloride [Oxytocin 30 Unit/500 ML Med  21 01:00 

Active





 -NS]   





 30 unit in 500 ml IV TITRATE   


 


 Sodium Chloride 0.9% [Normal Saline] Med  21 00:59 Active





 10 ml IV ASDIRECTED PRN   


 


 Sodium Chloride 0.9% [Saline Flush] Med  21 00:59 Active





 10 ml FLUSH ASDIRECTED PRN   


 


 Sodium Chloride 0.9% [Saline Flush] Med  21 00:59 Active





 2.5 ml FLUSH ASDIRECTED PRN   


 


 Terbutaline [Brethine] Med  21 00:59 Active





 0.25 mg SUBCUT ASDIRECTED PRN   


 


 Tranexamic Acid [Cyklokapron] 1,000 mg Med  21 00:59 Active





 Sodium Chloride 0.9% [Normal Saline] 100 ml   





 IV ONETIME   


 


 Water For Irrigation,Sterile [Sterile Water for Med  21 00:59 Active





 Irrigation]   





 1,000 ml IRR ASDIRECTED PRN   


 


 miSOPROStoL [Cytotec] Med  21 00:59 Active





 200 mcg PO ONETIME PRN   


 


 miSOPROStoL [Cytotec] Med  21 01:30 Active





 25 mcg PO ONETIME PRN   


 


 miSOPROStoL [Cytotec] Med  21 05:30 Active





 25 mcg PO Q4H PRN   


 


 miSOPROStoL [Cytotec] Med  21 01:30 Active





 25 mcg VAG ONETIME PRN   


 


 miSOPROStoL [Cytotec] Med  21 05:30 Active





 25 mcg VAG Q4H PRN   


 


 Fetal Scalp Electrode [WOMSER] Per Unit Routine Oth  21 00:59 Ordered


 


 Medication Administration Instruction [OM.PC] Q3H Oth  21 01:00 Ordered


 


 Peripheral IV Insertion Adult [OM.PC] Routine Oth  21 00:59 Ordered


 


 Resuscitation Status Routine Resus Stat  21 00:59 Ordered








                                Medication Orders





Butorphanol Tartrate (Butorphanol 1 Mg/Ml Sdv)  1 mg IVPUSH Q1H PRN


   PRN Reason: Pain


Carboprost Tromethamine (Carboprost Tromethamine 250 Mcg/1 Ml Amp)  250 mcg IM 

ASDIRECTED PRN


   PRN Reason: Post Partum Hemorrhage


Oxytocin/Sodium Chloride (Oxytocin 30 Unit/500 Ml-Ns)  30 unit in 500 mls @ 999 

mls/hr IV TITRATE CARLOS A


Tranexamic Acid 1,000 mg/ (Sodium Chloride)  110 mls @ 660 mls/hr IV ONETIME PRN


   PRN Reason: Bleeding


Oxytocin/Sodium Chloride (Oxytocin 30 Unit/500 Ml-Ns)  30 unit in 500 mls @ 2 

mls/hr IV TITRATE CARLOS A; Protocol


Ampicillin Sodium 1 gm/ Sodium (Chloride)  50 mls @ 100 mls/hr IV Q4H [facility]


Lactated Ringer's (Ringers, Lactated)  1,000 mls @ 150 mls/hr IV ASDIRECTED CARLOS A


   Last Admin: 21 01:50  Dose: 150 mls/hr


   Documented by: SHAWNA


Lidocaine HCl (Lidocaine 1% 50 Ml Mdv)  50 ml INJECT ONETIME PRN


   PRN Reason: Laceration repair


Methylergonovine Maleate (Methylergonovine 0.2 Mg/1 Ml Amp)  0.2 mg IM 

ASDIRECTED PRN


   PRN Reason: Post Partum Hemorrhage


Misoprostol (Misoprostol 200 Mcg Tab)  200 mcg PO ONETIME PRN


   PRN Reason: Post Partum Hemorrhage


Misoprostol (Misoprostol 25 Mcg (1/4 Of 100 Mcg) Tab)  25 mcg VAG ONETIME PRN


   PRN Reason: Cervical Ripening


   Last Admin: 21 01:58  Dose: 25 mcg


   Documented by: SHAWNA


Misoprostol (Misoprostol 25 Mcg (1/4 Of 100 Mcg) Tab)  25 mcg VAG Q4H PRN


   PRN Reason: Cervical Ripening


Misoprostol (Misoprostol 25 Mcg (1/4 Of 100 Mcg) Tab)  25 mcg PO ONETIME PRN


   PRN Reason: Cervical Ripening


   Last Admin: 21 01:58  Dose: 25 mcg


   Documented by: SHAWNA


Misoprostol (Misoprostol 25 Mcg (1/4 Of 100 Mcg) Tab)  25 mcg PO Q4H PRN


   PRN Reason: Cervical Ripening


Nalbuphine HCl (Nalbuphine 10 Mg/1 Ml Vial)  10 mg IVPUSH Q1H PRN


   PRN Reason: Pain (severe 7-10)


Ondansetron HCl (Ondansetron 4 Mg/2 Ml Sdv)  4 mg IVPUSH Q6H PRN


   PRN Reason: Nausea/Vomiting


Sodium Chloride (Sodium Chloride 0.9% 10 Ml Syringe)  10 ml FLUSH ASDIRECTED PRN


   PRN Reason: Keep Vein Open


Sodium Chloride (Sodium Chloride 0.9% 2.5 Ml Syringe)  2.5 ml FLUSH ASDIRECTED 

PRN


   PRN Reason: Keep Vein Open


Sodium Chloride (Sodium Chloride 0.9% 10 Ml Sdv)  10 ml IV ASDIRECTED PRN


   PRN Reason: IV Use


Sterile Water (Water For Irrigation,Sterile 1,000 Ml Container)  1,000 ml IRR 

ASDIRECTED PRN


   PRN Reason: delivery


Terbutaline Sulfate (Terbutaline 1 Mg/Ml Sdv)  0.25 mg SUBCUT ASDIRECTED PRN


   PRN Reason: Tacysystole








Assessment/Plan Comment:: 





Admit


A:  presenting to L&D at 37 4/7 weeks for induction of labor due to a 

pregnancy complicated by chronic hypertension and history of incompetent cervix.

  She had a cerclage placed on 10/22/20; it was removed in-office by Dr. Finnegan 

on 21.  Her hypertension is currently managed with 60 mg nifedipine PO 

daily and hydrochlorothiazide 12.5mg PO daily.  Growth ultrasound on 02/15/21 

noted EFW 2224 grams (91st percentile).  She has had weekly NSTs since 32 weeks.

 O+, Rubella immune, GBS negative


P: Anticipate ; cytotec to pitocin PRN; epidural PRN; Dr. Finnegan updated.

## 2021-03-26 NOTE — PCM.DEL
L & D Note





- General Info


Date of Service: 21


Mother's Due Date: 21





- Delivery Note


Labor: Spontaneous, Induced by ARM


Cervical Ripening Method: Misoprostil


Delivery Outcome: Livebirth


Infant Delivery Method: Spontaneous Vaginal Delivery-Single


Infant Delivery Mode: Spontaneous


Birth Presentation: Left Occiput Anterior (RUDY)


Nuchal Cord: None


Anesthesia Type: Epidural


Amniotic Fluid Description: Clear


Episiotomy Type: None


Laceration: None


Placenta: Intact, Manual Removal, Retained


Cord: 3 Vessels


Estimated Blood Loss: 500


Resuscitation Needed: No


: Stimulated, Warmed, [place] Used


APGAR Score 1 min: 8


APGAR Score 5 min: 9


Post Delivery Events: Retained Placenta


Second Stage Interventions: Reports: Encouragement Given, Pushing Effectively, 

Pushing, Feet in Foot Rests


Delivery Comments (Free Text/Narrative):: 


Shanice is a 29 yo current  at 37+4 weeks gestation (CARLOS(LMP) 2021) 

immediately S/P  of viable term NBF S/P IOL due to chronic HTN.  O pos, RI, 

GBS positive with adequate ampicillin prophylaxis in labor.  Adequate epidural 

analgesia.  Cephalic presentation.  Fetal head delivered RUDY spontaneously, 

fetal body following shortly after with the next push.  NBF placed to maternal 

abdomen, warmed, dried, stimulated with spontaneous cries.  Umbilical cord left 

intact for ~1 min, clamped x 2, cut by jennifer.  Pitocin bolus commenced, gentle 

cord traction applied for active third stage management.  Placenta not birthed 

spontaneously within 30 minutes.  Dr. Finnegan called to bedside, manual placenta 

removal completed ~34 min S/P NBF.  Placenta intact, [place], 3VC.  Perineum 

inspected, intact.  Uterus firm @U.  Scant vaginal bleeding noted.  EBL ~500 ml.

 APGARS 8/9.  Birth weight 8 lb 2 oz








- General Info


Date of Service: 21


Admission Dx/Problem (Free Text): 


                   Patient Status Order with Admit Dx/Problem





21 00:59


Patient Status [ADT] Routine 








                           Admission Diagnosis/Problem











Admission Diagnosis/Problem    Pregnancy














21 03:38


 presenting to L&D at 37 4/7 weeks for induction of labor due to a 

pregnancy complicated by chronic hypertension and history of incompetent cervix.

  She had a cerclage placed on 10/22/20; it was removed in-office by Dr. Finnegan 

on 21.  Her hypertension is currently managed with 60 mg nifedipine PO 

daily and hydrochlorothiazide 12.5mg PO daily.  Growth ultrasound on 02/15/21 

noted EFW 2224 grams (91st percentile).  She has had weekly NSTs since 32 weeks.

 O+, Rubella immune, GBS negative


21 03:56





Functional Status: Reports: Pain Controlled, Tolerating Diet, Ambulating, 

Urinating





- Review of Systems


General: Reports: No Symptoms


HEENT: Reports: No Symptoms


Pulmonary: Reports: No Symptoms


Cardiovascular: Reports: No Symptoms


Gastrointestinal: Reports: No Symptoms


Genitourinary: Reports: No Symptoms


Musculoskeletal: Reports: No Symptoms


Skin: Reports: No Symptoms


Neurological: Reports: No Symptoms


Psychiatric: Reports: No Symptoms





- Patient Data


Vitals - Most Recent: 


Hemodynamically stable, afebrile.  /82 (91), .  Temp 98.5 F.


Weight - Most Recent: 262 lb


Lab Results Last 24 Hours: 


                         Laboratory Results - last 24 hr











  21 Range/Units





  00:50 00:50 01:15 


 


WBC  9.17    (4.0-11.0)  K/uL


 


RBC  4.91    (4.30-5.90)  M/uL


 


Hgb  13.0    (12.0-16.0)  g/dL


 


Hct  36.2    (36.0-46.0)  %


 


MCV  73.7 L    (80.0-98.0)  fL


 


MCH  26.5 L    (27.0-32.0)  pg


 


MCHC  35.9    (31.0-37.0)  g/dL


 


RDW Std Deviation  39.0    (28.0-62.0)  fl


 


RDW Coeff of Tenisha  15    (11.0-15.0)  %


 


Plt Count  290    (150-400)  K/uL


 


MPV  12.20 H    (7.40-12.00)  fL


 


Nucleated RBC %  0.0    /100WBC


 


Nucleated RBCs #  0    K/uL


 


SARS-CoV-2 RNA (MICHELLE)    NEGATIVE  (NEGATIVE)  


 


Blood Type   O POSITIVE   


 


Antibody Screen   NEGATIVE   











Med Orders - Current: 


                               Current Medications





Acetaminophen (Acetaminophen 500 Mg Tab)  500 mg PO Q4H PRN


   PRN Reason: Pain


Acetaminophen (Acetaminophen 500 Mg Tab)  1,000 mg PO Q4H PRN


   PRN Reason: Pain


Benzocaine/Menthol (Benzocaine/Menthol 20%-0.5% Spray 78 Gm Cannister)  78 gm 

TOP ASDIRECTED PRN


   PRN Reason: Perineal Comfort Measure


Bisacodyl (Bisacodyl 10 Mg Supp)  10 mg RECTAL ONETIME PRN


   PRN Reason: Constipation


Docusate Sodium (Docusate Sodium 100 Mg Cap)  100 mg PO BID PRN


   PRN Reason: Constipation


Emollient Ointment (Lanolin 100% Cream 7 Gm Tube)  0 gm TOP ASDIRECTED PRN


   PRN Reason: Sore Nipples


Ampicillin Sodium 2 gm/ Sodium (Chloride)  100 mls @ 200 mls/hr IV ONETIME ONE


   Stop: 21 17:56


Ibuprofen (Ibuprofen 400 Mg Tab)  400 mg PO Q4H PRN


   PRN Reason: Pain


Ibuprofen (Ibuprofen 800 Mg Tab)  800 mg PO Q6H PRN


   PRN Reason: Pain


Oxycodone HCl (Oxycodone 5 Mg Tab)  5 mg PO Q2H PRN


   PRN Reason: Pain


Witch Hazel (Witch Hazel Medicated Pads 40/Jar)  1 pad TOP ASDIRECTED PRN


   PRN Reason: comfort care





Discontinued Medications





Ampicillin Sodium (Ampicillin 1 Gm Vial) Confirm Administered Dose 1 gm .ROUTE 

.STK-MED ONE


   Stop: 21 06:04


   Last Admin: 21 06:26 Dose:  1 gm


   Documented by: 


Butorphanol Tartrate (Butorphanol 1 Mg/Ml Sdv)  1 mg IVPUSH Q1H PRN


   PRN Reason: Pain


Carboprost Tromethamine (Carboprost Tromethamine 250 Mcg/1 Ml Amp)  250 mcg IM 

ASDIRECTED PRN


   PRN Reason: Post Partum Hemorrhage


Hydroxyzine HCl (Hydroxyzine Hcl 25 Mg Tab)  50 mg PO ONETIME ONE


   Stop: 21 02:42


   Last Admin: 21 02:52 Dose:  50 mg


   Documented by: 


Oxytocin/Sodium Chloride (Oxytocin 30 Unit/500 Ml-Ns)  30 unit in 500 mls @ 999 

mls/hr IV TITRATE CARLOS A


Tranexamic Acid 1,000 mg/ (Sodium Chloride)  110 mls @ 660 mls/hr IV ONETIME PRN


   PRN Reason: Bleeding


Oxytocin/Sodium Chloride (Oxytocin 30 Unit/500 Ml-Ns)  30 unit in 500 mls @ 2 

mls/hr IV TITRATE CARLOS A; Protocol


   Last Titration: 21 09:05 Dose:  0 munits/min, 0 mls/hr


   Documented by: 


Ampicillin Sodium 2 gm/ Sodium (Chloride)  100 mls @ 200 mls/hr IV ONETIME ONE


   Stop: 21 01:59


   Last Admin: 21 01:53 Dose:  200 mls/hr


   Documented by: 


Ampicillin Sodium 1 gm/ Sodium (Chloride)  50 mls @ 100 mls/hr IV Q4H CARLOS A


   Last Admin: 21 06:27 Dose:  Not Given


   Documented by: 


Lactated Ringer's (Ringers, Lactated)  1,000 mls @ 150 mls/hr IV ASDIRECTED CARLOS A


   Last Admin: 21 11:31 Dose:  150 mls/hr


   Documented by: 


Sodium Chloride (Normal Saline) Confirm Administered Dose 50 mls @ as directed 

.ROUTE .STK-MED ONE


   Stop: 21 06:04


   Last Admin: 21 06:26 Dose:  100 mls/hr


   Documented by: 


Ampicillin Sodium 1 gm/ Sodium (Chloride)  50 mls @ 100 mls/hr IV Q4H [facility]


   Last Admin: 21 15:15 Dose:  100 mls/hr


   Documented by: 


Fentanyl/Bupivacaine HCl (Fentanyl/Bupivacaine/Ns 2 Mcg-0.125% 250 Ml) Confirm 

Administered Dose 250 mls @ as directed .ROUTE .STK-MED ONE


   Stop: 21 10:40


Lidocaine HCl (Lidocaine 1% 50 Ml Mdv)  50 ml INJECT ONETIME PRN


   PRN Reason: Laceration repair


Methylergonovine Maleate (Methylergonovine 0.2 Mg/1 Ml Amp)  0.2 mg IM 

ASDIRECTED PRN


   PRN Reason: Post Partum Hemorrhage


Misoprostol (Misoprostol 200 Mcg Tab)  200 mcg PO ONETIME PRN


   PRN Reason: Post Partum Hemorrhage


Misoprostol (Misoprostol 25 Mcg (1/4 Of 100 Mcg) Tab)  25 mcg VAG ONETIME PRN


   PRN Reason: Cervical Ripening


   Last Admin: 21 01:58 Dose:  25 mcg


   Documented by: 


Misoprostol (Misoprostol 25 Mcg (1/4 Of 100 Mcg) Tab)  25 mcg VAG Q4H PRN


   PRN Reason: Cervical Ripening


Misoprostol (Misoprostol 25 Mcg (1/4 Of 100 Mcg) Tab)  25 mcg PO ONETIME PRN


   PRN Reason: Cervical Ripening


   Last Admin: 21 01:58 Dose:  25 mcg


   Documented by: 


Misoprostol (Misoprostol 25 Mcg (1/4 Of 100 Mcg) Tab)  25 mcg PO Q4H PRN


   PRN Reason: Cervical Ripening


Misoprostol (Misoprostol 25 Mcg (1/4 Of 100 Mcg) Tab)  50 mcg PO ONETIME ONE


   Stop: 21 09:26


   Last Admin: 21 09:38 Dose:  50 mcg


   Documented by: 


Nalbuphine HCl (Nalbuphine 10 Mg/1 Ml Vial)  10 mg IVPUSH Q1H PRN


   PRN Reason: Pain (severe 7-10)


Ondansetron HCl (Ondansetron 4 Mg/2 Ml Sdv)  4 mg IVPUSH Q6H PRN


   PRN Reason: Nausea/Vomiting


Ropivacaine (Ropivacaine 0.2% Pf 2 Mg/Ml 20 Ml Sdv) Confirm Administered Dose 20

 ml .ROUTE .STK-MED ONE


   Stop: 21 10:40


Sodium Chloride (Sodium Chloride 0.9% 10 Ml Syringe)  10 ml FLUSH ASDIRECTED PRN


   PRN Reason: Keep Vein Open


Sodium Chloride (Sodium Chloride 0.9% 2.5 Ml Syringe)  2.5 ml FLUSH ASDIRECTED 

PRN


   PRN Reason: Keep Vein Open


Sodium Chloride (Sodium Chloride 0.9% 10 Ml Sdv)  10 ml IV ASDIRECTED PRN


   PRN Reason: IV Use


Sterile Water (Water For Irrigation,Sterile 1,000 Ml Container)  1,000 ml IRR 

ASDIRECTED PRN


   PRN Reason: delivery


Terbutaline Sulfate (Terbutaline 1 Mg/Ml Sdv)  0.25 mg SUBCUT ASDIRECTED PRN


   PRN Reason: Tacysystole











- Exam


General: Alert, Oriented, Cooperative, No Acute Distress


HEENT: Pupils Equal, Mucous Membr. Moist/Pink


Neck: Supple


Lungs: Clear to Auscultation, Normal Respiratory Effort


Cardiovascular: Regular Rate, Regular Rhythm


GI/Abdominal Exam: Normal Bowel Sounds, Soft, Non-Tender, No Organomegaly, No 

Distention, No Abnormal Bruit, No Mass, Pelvis Stable


 (Female) Exam: Normal External Exam, Normal Bimanual Exam, Enlarged Uterus 

(Postpartum uterus, firm @U), Vaginal Bleeding (Small rubra lochia, no clots.)


Back Exam: Normal Inspection, Full Range of Motion


Extremities: Normal Inspection, Normal Range of Motion, Non-Tender, No Pedal 

Edema, Normal Capillary Refill


Skin: Warm, Dry, Intact


Neurological: No New Focal Deficit (BLE epidural analgesia)


Psy/Mental Status: Alert, Normal Affect, Normal Mood





- Problem List & Annotations


(1)  (spontaneous vaginal delivery)


SNOMED Code(s): 376342204


   Code(s): O80 - ENCOUNTER FOR FULL-TERM UNCOMPLICATED DELIVERY   Status: Acute

   Priority: High   Current Visit: Yes   





(2) Chronic hypertension affecting pregnancy


SNOMED Code(s): 30975409


   Code(s): O10.919 - UNSP PRE-EXISTING HTN COMP PREGNANCY, UNSP TRIMESTER   

Status: Acute   Priority: High   Current Visit: Yes   





(3) Lactating mother


SNOMED Code(s): 923091054, 190418468


   Code(s): Z39.1 - ENCOUNTER FOR CARE AND EXAMINATION OF LACTATING MOTHER   S

tatus: Acute   Priority: High   Current Visit: Yes   





- Problem List Review


Problem List Initiated/Reviewed/Updated: Yes





- My Orders


Last 24 Hours: 


My Active Orders





21 Dinner


Regular Diet [DIET] 





21 17:26


Patient Status [ADT] Routine 


May Shower [RC] ASDIRECTED 


Up ad Brenda [RC] ASDIRECTED 


Vital Signs [RC] PER UNIT ROUTINE 


Acetaminophen [Tylenol Extra Strength]   1,000 mg PO Q4H PRN 


Acetaminophen [Tylenol Extra Strength]   500 mg PO Q4H PRN 


Benzocaine/Menthol [Dermoplast Pain Relief 20%-0.5% Spray]   78 gm TOP 

ASDIRECTED PRN 


Docusate Sodium [Colace]   100 mg PO BID PRN 


Ibuprofen [Motrin]   400 mg PO Q4H PRN 


Ibuprofen [Motrin]   800 mg PO Q6H PRN 


Lanolin [Lansinoh HPA]   See Dose Instructions  TOP ASDIRECTED PRN 


bisacodyL [Dulcolax]   10 mg RECTAL ONETIME PRN 


oxyCODONE   5 mg PO Q2H PRN 


witch hazeL [Tucks]   1 pad TOP ASDIRECTED PRN 


Assess Lochia [WOMSER] Per Unit Routine 


Assess Uterine Involution [WOMSER] Per Unit Routine 


Peripheral IV Discontinue [OM.PC] Routine 


Resuscitation Status Routine 





21 17:27


Cooling Warming Measures [RC] ASDIRECTED 


Ampicillin 2 gm   Sodium Chloride 0.9% [Normal Saline] 100 ml IV ONETIME 


Ice Therapy [OM.PC] Per Unit Routine 


Perineal Care [OM.PC] Per Unit Routine 


Sitz Bath [OM.PC] Per Unit Routine 





21 05:11


HEMOGLOBIN/HEMATOCRIT,HH [HEME] Timed 














- Plan


Plan:: 


Admit inpatient to postpartum unit S/P  of term, viable NBF.  Retained 

placenta, administer 2 grams ampicillin IV once for postpartum endometritis 

prophylaxis.  Continue 60 mg nifedipine PO daily and hydrochlorothiazide 12.5 mg

 PO daily for CHTN management.  Resume regular diet as tolerated.  Plan to D/C 

epidural now, may ambulate with assistance in 2-4 hours or when sensation 

returns.  If patient does not void within 6 hours S/P delivery, notify provider.

  Lactation support may be provided if desired or needed.   Plan to collect CBC,

 CMP in am.  See new orders.  Dr. Finnegan notified and agreeable with POC.

## 2021-03-27 VITALS — DIASTOLIC BLOOD PRESSURE: 80 MMHG | HEART RATE: 104 BPM | SYSTOLIC BLOOD PRESSURE: 134 MMHG

## 2021-03-27 LAB
BUN SERPL-MCNC: 13 MG/DL (ref 7–18)
CHLORIDE SERPL-SCNC: 105 MMOL/L (ref 98–107)
CO2 SERPL-SCNC: 25.3 MMOL/L (ref 21–32)
GLUCOSE SERPL-MCNC: 87 MG/DL (ref 74–106)
POTASSIUM SERPL-SCNC: 4.1 MMOL/L (ref 3.5–5.1)
SODIUM SERPL-SCNC: 138 MMOL/L (ref 136–145)

## 2021-03-27 NOTE — [id number]
**Discharge Summary





- Hospital Course


Free Text/Narrative:: 


Shanice is a 29 yo current  PPD1 S/P  of viable term NBF S/P IOL due to 

chronic HTN.  O pos, RI, GBS positive with adequate ampicillin prophylaxis in 

labor.  Retained placenta with prophylactic antibiotic administered IV 

postpartum once.  Patient reports she is doing well with no complaints or 

concerns at this time.  Hemodynamically stable with PO anti-hypertensives, 

afebrile.  Breast and bottle feeding without difficulty.  Ambulating, hydrating,

eating, and voiding independently and without difficulty.  Perineum intact.  

Patient verbalizes her desire to be discharged home today.





Diagnosis: Stroke: No





- Discharge Data


Discharge Date: 21


Discharge Disposition: Home, Self-Care 01


Condition: Good





- Referral to Home Health


Primary Care Physician: 


PCP None








- Discharge Diagnosis/Problem(s)


(1)  (spontaneous vaginal delivery)


SNOMED Code(s): 799170559


   ICD Code: O80 - ENCOUNTER FOR FULL-TERM UNCOMPLICATED DELIVERY   Status: 

Acute   Priority: High   Current Visit: Yes   





(2) Chronic hypertension affecting pregnancy


SNOMED Code(s): 51628113


   ICD Code: O10.919 - UNSP PRE-EXISTING HTN COMP PREGNANCY, UNSP TRIMESTER   

Status: Acute   Priority: High   Current Visit: Yes   





(3) Lactating mother


SNOMED Code(s): 906145875, 903432718


   ICD Code: Z39.1 - ENCOUNTER FOR CARE AND EXAMINATION OF LACTATING MOTHER   

Status: Acute   Priority: High   Current Visit: Yes   





- Patient Instructions


Diet: Usual Diet as Tolerated, Drink 8-10+ Glasses/Day


Activity: As Tolerated, Bedrest, No Strenuous Activities


Driving: May Drive Today


Showering/Bathing: May Shower


Showering/Bathing, Other: May sitz bath for perineal comfort.


Notify Provider of: Fever, Increased Pain, Swelling and Redness, Drainage, 

Nausea and/or Vomiting





- Discharge Plan


*PRESCRIPTION DRUG MONITORING PROGRAM REVIEWED*: No


*COPY OF PRESCRIPTION DRUG MONITORING REPORT IN PATIENT ROBERT: No


Prescriptions/Med Rec: 


Ibuprofen [Motrin] 800 mg PO Q8H PRN #90 tablet


 PRN Reason: Pain


Home Medications: 


                                    Home Meds





Mv-Mn/Iron/FA/Herbal/Digestive [Prenatal One Tablet] 1 tab PO DAILY 10/19/20 

[History]


Ibuprofen [Motrin] 800 mg PO Q8H PRN #90 tablet 21 [Rx]


NIFEdipine [Procardia XL] 60 mg PO DAILY  tab.er 21 [Rx]


hydroCHLOROthiazide [Hydrochlorothiazide] 12.5 mg PO DAILY  cap 21 [Rx]








Patient Handouts:  Postpartum Baby Blues, Postpartum Hypertension, Postpartum 

Care After Vaginal Delivery


Referrals: 


Winsome Corral, CNM [[facility]] -  (The clinic will call with your 1 week blood 

pressure appointment and 6 week postpartum follow-up appointment.)





- Discharge Summary/Plan Comment


DC Time >30 min.: Yes


Discharge Summary/Plan Comment: 


Warning S/Ss, when to call for help discussed, no questions.  RTO in 1 week for 

postpartum BP check or sooner if indicated.  RTO in 6 weeks for postpartum 

visit, or sooner if problems occur.








- General Info


Date of Service: 21


Admission Dx/Problem (Free Text: 


                   Patient Status Order with Admit Dx/Problem





21 00:59


Patient Status [ADT] Routine 








                           Admission Diagnosis/Problem











Admission Diagnosis/Problem    Pregnancy














21 03:38


 presenting to L&D at 37 4/7 weeks for induction of labor due to a 

pregnancy complicated by chronic hypertension and history of incompetent cervix.

  She had a cerclage placed on 10/22/20; it was removed in-office by Dr. Finnegan 

on 21.  Her hypertension is currently managed with 60 mg nifedipine PO 

daily and hydrochlorothiazide 12.5mg PO daily.  Growth ultrasound on 02/15/21 

noted EFW 2224 grams (91st percentile).  She has had weekly NSTs since 32 weeks.

 O+, Rubella immune, GBS negative


21 03:56





Functional Status: Reports: Pain Controlled, Tolerating Diet, Ambulating, 

Urinating





- Review of Systems


General: Reports: No Symptoms


HEENT: Reports: No Symptoms


Pulmonary: Reports: No Symptoms


Cardiovascular: Reports: No Symptoms


Gastrointestinal: Reports: No Symptoms


Genitourinary: Reports: No Symptoms


Musculoskeletal: Reports: No Symptoms


Skin: Reports: No Symptoms


Neurological: Reports: No Symptoms


Psychiatric: Reports: No Symptoms





- Patient Data


Vitals - Most Recent: 


                                Last Vital Signs











Temp  98.0 F   21 08:00


 


Pulse  97   21 08:00


 


Resp  17   21 08:00


 


BP  133/81   21 10:30


 


Pulse Ox  98   21 08:00











Weight - Most Recent: 262 lb


Lab Results - Last 24 hrs: 


                         Laboratory Results - last 24 hr











  21 Range/Units





  05:25 05:25 


 


WBC  14.39 H   (4.0-11.0)  K/uL


 


RBC  3.90 L   (4.30-5.90)  M/uL


 


Hgb  10.2 L   (12.0-16.0)  g/dL


 


Hct  28.9 L   (36.0-46.0)  %


 


MCV  74.1 L   (80.0-98.0)  fL


 


MCH  26.2 L   (27.0-32.0)  pg


 


MCHC  35.3   (31.0-37.0)  g/dL


 


RDW Std Deviation  39.5   (28.0-62.0)  fl


 


RDW Coeff of Tenisha  15   (11.0-15.0)  %


 


Plt Count  249   (150-400)  K/uL


 


MPV  11.80   (7.40-12.00)  fL


 


Neut % (Auto)  69.3   (48.0-80.0)  %


 


Lymph % (Auto)  20.5   (16.0-40.0)  %


 


Mono % (Auto)  9.4   (0.0-15.0)  %


 


Eos % (Auto)  0.7   (0.0-7.0)  %


 


Baso % (Auto)  0.1   (0.0-1.5)  %


 


Neut # (Auto)  10.0 H   (1.4-5.7)  K/uL


 


Lymph # (Auto)  3.0 H   (0.6-2.4)  K/uL


 


Mono # (Auto)  1.4 H   (0.0-0.8)  K/uL


 


Eos # (Auto)  0.1   (0.0-0.7)  K/uL


 


Baso # (Auto)  0.0   (0.0-0.1)  K/uL


 


Nucleated RBC %  0.0   /100WBC


 


Nucleated RBCs #  0   K/uL


 


Sodium   138  (136-145)  mmol/L


 


Potassium   4.1  (3.5-5.1)  mmol/L


 


Chloride   105  ()  mmol/L


 


Carbon Dioxide   25.3  (21.0-32.0)  mmol/L


 


BUN   13  (7.0-18.0)  mg/dL


 


Creatinine   0.9  (0.6-1.0)  mg/dL


 


Est Cr Clr Drug Dosing   81.50  mL/min


 


Estimated GFR (MDRD)   > 60.0  ml/min


 


Glucose   87  ()  mg/dL


 


Calcium   8.9  (8.5-10.1)  mg/dL


 


Total Bilirubin   0.2  (0.2-1.0)  mg/dL


 


AST   20  (15-37)  IU/L


 


ALT   18  (14-63)  IU/L


 


Alkaline Phosphatase   162 H  ()  U/L


 


Total Protein   6.0 L  (6.4-8.2)  g/dL


 


Albumin   2.1 L  (3.4-5.0)  g/dL


 


Globulin   3.9  (2.6-4.0)  g/dL


 


Albumin/Globulin Ratio   0.5 L  (0.9-1.6)  











Med Orders - Current: 


                               Current Medications





Acetaminophen (Acetaminophen 500 Mg Tab)  500 mg PO Q4H PRN


   PRN Reason: Pain


Acetaminophen (Acetaminophen 500 Mg Tab)  1,000 mg PO Q4H PRN


   PRN Reason: Pain


Benzocaine/Menthol (Benzocaine/Menthol 20%-0.5% Spray 78 Gm Cannister)  78 gm 

TOP ASDIRECTED PRN


   PRN Reason: Perineal Comfort Measure


   Last Admin: 21 20:38 Dose:  78 gram


   Documented by: 


Bisacodyl (Bisacodyl 10 Mg Supp)  10 mg RECTAL ONETIME PRN


   PRN Reason: Constipation


Docusate Sodium (Docusate Sodium 100 Mg Cap)  100 mg PO BID PRN


   PRN Reason: Constipation


Emollient Ointment (Lanolin 100% Cream 7 Gm Tube)  0 gm TOP ASDIRECTED PRN


   PRN Reason: Sore Nipples


Hydrochlorothiazide (Hydrochlorothiazide 12.5 Mg Cap)  12.5 mg PO DAILY [facility]


   Last Admin: 21 09:33 Dose:  12.5 mg


   Documented by: 


Ibuprofen (Ibuprofen 400 Mg Tab)  400 mg PO Q4H PRN


   PRN Reason: Pain


Ibuprofen (Ibuprofen 800 Mg Tab)  800 mg PO Q6H PRN


   PRN Reason: Pain


   Last Admin: 21 06:25 Dose:  800 mg


   Documented by: 


Nifedipine (Nifedipine 30 Mg Tab.Er)  60 mg PO DAILY [facility]


   Last Admin: 21 09:18 Dose:  60 mg


   Documented by: 


Oxycodone HCl (Oxycodone 5 Mg Tab)  5 mg PO Q2H PRN


   PRN Reason: Pain


Witch Hazel (Witch Hazel Medicated Pads 40/Jar)  1 pad TOP ASDIRECTED PRN


   PRN Reason: comfort care


   Last Admin: 21 20:38 Dose:  1 pad


   Documented by: 





Discontinued Medications





Ampicillin Sodium (Ampicillin 1 Gm Vial) Confirm Administered Dose 1 gm .ROUTE 

.STK-MED ONE


   Stop: 21 06:04


   Last Admin: 21 06:26 Dose:  1 gm


   Documented by: 


Butorphanol Tartrate (Butorphanol 1 Mg/Ml Sdv)  1 mg IVPUSH Q1H PRN


   PRN Reason: Pain


Carboprost Tromethamine (Carboprost Tromethamine 250 Mcg/1 Ml Amp)  250 mcg IM 

ASDIRECTED PRN


   PRN Reason: Post Partum Hemorrhage


Hydroxyzine HCl (Hydroxyzine Hcl 25 Mg Tab)  50 mg PO ONETIME ONE


   Stop: 21 02:42


   Last Admin: 21 02:52 Dose:  50 mg


   Documented by: 


Oxytocin/Sodium Chloride (Oxytocin 30 Unit/500 Ml-Ns)  30 unit in 500 mls @ 999 

mls/hr IV TITRATE CARLOS A


Tranexamic Acid 1,000 mg/ (Sodium Chloride)  110 mls @ 660 mls/hr IV ONETIME PRN


   PRN Reason: Bleeding


Oxytocin/Sodium Chloride (Oxytocin 30 Unit/500 Ml-Ns)  30 unit in 500 mls @ 2 

mls/hr IV TITRATE [facility]; Protocol


   Last Titration: 21 16:27 Dose:  500 munits/min, 500 mls/hr


   Documented by: 


Ampicillin Sodium 2 gm/ Sodium (Chloride)  100 mls @ 200 mls/hr IV ONETIME ONE


   Stop: 21 01:59


   Last Admin: 21 01:53 Dose:  200 mls/hr


   Documented by: 


Ampicillin Sodium 1 gm/ Sodium (Chloride)  50 mls @ 100 mls/hr IV Q4H [facility]


   Last Admin: 21 06:27 Dose:  Not Given


   Documented by: 


Lactated Ringer's (Ringers, Lactated)  1,000 mls @ 150 mls/hr IV ASDIRECTED [facility]


   Last Admin: 21 11:31 Dose:  150 mls/hr


   Documented by: 


Sodium Chloride (Normal Saline) Confirm Administered Dose 50 mls @ as directed 

.ROUTE .STK-MED ONE


   Stop: 21 06:04


   Last Admin: 21 06:26 Dose:  100 mls/hr


   Documented by: 


Ampicillin Sodium 1 gm/ Sodium (Chloride)  50 mls @ 100 mls/hr IV Q4H [facility]


   Last Admin: 21 15:15 Dose:  100 mls/hr


   Documented by: 


Fentanyl/Bupivacaine HCl (Fentanyl/Bupivacaine/Ns 2 Mcg-0.125% 250 Ml) Confirm 

Administered Dose 250 mls @ as directed .ROUTE .STK-MED ONE


   Stop: 21 10:40


Ampicillin Sodium 2 gm/ Sodium (Chloride)  100 mls @ 200 mls/hr IV ONETIME ONE


   Stop: 21 18:14


   Last Admin: 21 18:07 Dose:  200 mls/hr


   Documented by: 


Lidocaine HCl (Lidocaine 1% 50 Ml Mdv)  50 ml INJECT ONETIME PRN


   PRN Reason: Laceration repair


Methylergonovine Maleate (Methylergonovine 0.2 Mg/1 Ml Amp)  0.2 mg IM 

ASDIRECTED PRN


   PRN Reason: Post Partum Hemorrhage


Misoprostol (Misoprostol 200 Mcg Tab)  200 mcg PO ONETIME PRN


   PRN Reason: Post Partum Hemorrhage


Misoprostol (Misoprostol 25 Mcg (1/4 Of 100 Mcg) Tab)  25 mcg VAG ONETIME PRN


   PRN Reason: Cervical Ripening


   Last Admin: 21 01:58 Dose:  25 mcg


   Documented by: 


Misoprostol (Misoprostol 25 Mcg (1/4 Of 100 Mcg) Tab)  25 mcg VAG Q4H PRN


   PRN Reason: Cervical Ripening


Misoprostol (Misoprostol 25 Mcg (1/4 Of 100 Mcg) Tab)  25 mcg PO ONETIME PRN


   PRN Reason: Cervical Ripening


   Last Admin: 21 01:58 Dose:  25 mcg


   Documented by: 


Misoprostol (Misoprostol 25 Mcg (1/4 Of 100 Mcg) Tab)  25 mcg PO Q4H PRN


   PRN Reason: Cervical Ripening


Misoprostol (Misoprostol 25 Mcg (1/4 Of 100 Mcg) Tab)  50 mcg PO ONETIME ONE


   Stop: 21 09:26


   Last Admin: 21 09:38 Dose:  50 mcg


   Documented by: 


Nalbuphine HCl (Nalbuphine 10 Mg/1 Ml Vial)  10 mg IVPUSH Q1H PRN


   PRN Reason: Pain (severe 7-10)


Ondansetron HCl (Ondansetron 4 Mg/2 Ml Sdv)  4 mg IVPUSH Q6H PRN


   PRN Reason: Nausea/Vomiting


Ropivacaine (Ropivacaine 0.2% Pf 2 Mg/Ml 20 Ml Sdv) Confirm Administered Dose 20

 ml .ROUTE .AVIA-MED ONE


   Stop: 21 10:40


   Last Admin: 21 08:17 Dose:  Not Given


   Documented by: 


Sodium Chloride (Sodium Chloride 0.9% 10 Ml Syringe)  10 ml FLUSH ASDIRECTED PRN


   PRN Reason: Keep Vein Open


Sodium Chloride (Sodium Chloride 0.9% 2.5 Ml Syringe)  2.5 ml FLUSH ASDIRECTED 

PRN


   PRN Reason: Keep Vein Open


Sodium Chloride (Sodium Chloride 0.9% 10 Ml Sdv)  10 ml IV ASDIRECTED PRN


   PRN Reason: IV Use


Sterile Water (Water For Irrigation,Sterile 1,000 Ml Container)  1,000 ml IRR 

ASDIRECTED PRN


   PRN Reason: delivery


Terbutaline Sulfate (Terbutaline 1 Mg/Ml Sdv)  0.25 mg SUBCUT ASDIRECTED PRN


   PRN Reason: Tacysystole











- Exam


General: Reports: Alert, Oriented, Cooperative, No Acute Distress


HEENT: Reports: Pupils Equal, Mucous Membr. Moist/Pink


Neck: Reports: Supple


Lungs: Reports: Clear to Auscultation, Normal Respiratory Effort


Cardiovascular: Reports: Regular Rate, Regular Rhythm


GI/Abdominal Exam: Normal Bowel Sounds, Soft, Non-Tender, No Organomegaly, No 

Distention


 (Female) Exam: Normal External Exam, Enlarged Uterus (Postpartum uterus, firm

 U-1), Vaginal Bleeding (Small to moderate rubra lochia, no clots.)


Rectal (Female) Exam: Normal Exam, Normal Rectal Tone


Back Exam: Reports: Normal Inspection, Full Range of Motion


Extremities: Normal Inspection, Normal Range of Motion, Non-Tender, No Pedal 

Edema, Normal Capillary Refill


Skin: Reports: Warm, Dry, Intact


Neurological: Reports: No New Focal Deficit


Psy/Mental Status: Reports: Alert, Normal Affect, Normal Mood

## 2022-02-23 ENCOUNTER — HOSPITAL ENCOUNTER (OUTPATIENT)
Dept: HOSPITAL 56 - MW.SDS | Age: 32
Discharge: HOME | End: 2022-02-23
Attending: OBSTETRICS & GYNECOLOGY
Payer: COMMERCIAL

## 2022-02-23 VITALS — SYSTOLIC BLOOD PRESSURE: 123 MMHG | DIASTOLIC BLOOD PRESSURE: 74 MMHG | HEART RATE: 77 BPM

## 2022-02-23 DIAGNOSIS — Z91.013: ICD-10-CM

## 2022-02-23 DIAGNOSIS — Z79.899: ICD-10-CM

## 2022-02-23 DIAGNOSIS — E66.01: ICD-10-CM

## 2022-02-23 DIAGNOSIS — I10: ICD-10-CM

## 2022-02-23 DIAGNOSIS — O00.102: Primary | ICD-10-CM

## 2022-02-23 PROCEDURE — 49329 UNLSTD LAPS PX ABD PERTM&OMN: CPT

## 2022-02-23 PROCEDURE — 86900 BLOOD TYPING SEROLOGIC ABO: CPT

## 2022-02-23 PROCEDURE — 86901 BLOOD TYPING SEROLOGIC RH(D): CPT

## 2022-02-23 PROCEDURE — 36415 COLL VENOUS BLD VENIPUNCTURE: CPT

## 2022-02-23 PROCEDURE — 86850 RBC ANTIBODY SCREEN: CPT

## 2022-02-23 PROCEDURE — 59151 TREAT ECTOPIC PREGNANCY: CPT

## 2022-02-23 PROCEDURE — 85027 COMPLETE CBC AUTOMATED: CPT

## 2022-03-29 ENCOUNTER — HOSPITAL ENCOUNTER (EMERGENCY)
Dept: HOSPITAL 56 - MW.ED | Age: 32
Discharge: HOME | End: 2022-03-29
Payer: COMMERCIAL

## 2022-03-29 VITALS — HEART RATE: 97 BPM | DIASTOLIC BLOOD PRESSURE: 68 MMHG | SYSTOLIC BLOOD PRESSURE: 127 MMHG

## 2022-03-29 DIAGNOSIS — E66.9: ICD-10-CM

## 2022-03-29 DIAGNOSIS — Z91.013: ICD-10-CM

## 2022-03-29 DIAGNOSIS — Z86.16: ICD-10-CM

## 2022-03-29 DIAGNOSIS — N39.0: Primary | ICD-10-CM

## 2022-03-29 DIAGNOSIS — I10: ICD-10-CM

## 2022-03-29 DIAGNOSIS — M54.9: ICD-10-CM

## 2022-03-29 DIAGNOSIS — Z79.899: ICD-10-CM

## 2022-03-29 LAB
BUN SERPL-MCNC: 15 MG/DL (ref 7–18)
CHLORIDE SERPL-SCNC: 102 MMOL/L (ref 98–107)
CO2 SERPL-SCNC: 23 MMOL/L (ref 21–32)
GLUCOSE SERPL-MCNC: 100 MG/DL (ref 74–106)
LIPASE SERPL-CCNC: 77 U/L (ref 73–393)
POTASSIUM SERPL-SCNC: 3.6 MMOL/L (ref 3.5–5.1)
SODIUM SERPL-SCNC: 135 MMOL/L (ref 136–145)

## 2022-03-29 PROCEDURE — 99284 EMERGENCY DEPT VISIT MOD MDM: CPT

## 2022-03-29 PROCEDURE — 85025 COMPLETE CBC W/AUTO DIFF WBC: CPT

## 2022-03-29 PROCEDURE — 80053 COMPREHEN METABOLIC PANEL: CPT

## 2022-03-29 PROCEDURE — 84703 CHORIONIC GONADOTROPIN ASSAY: CPT

## 2022-03-29 PROCEDURE — 81001 URINALYSIS AUTO W/SCOPE: CPT

## 2022-03-29 PROCEDURE — 83690 ASSAY OF LIPASE: CPT

## 2022-03-29 PROCEDURE — 36415 COLL VENOUS BLD VENIPUNCTURE: CPT

## 2022-03-29 PROCEDURE — 87086 URINE CULTURE/COLONY COUNT: CPT

## 2022-08-02 ENCOUNTER — HOSPITAL ENCOUNTER (OUTPATIENT)
Dept: HOSPITAL 56 - MW.ED | Age: 32
Setting detail: OBSERVATION
LOS: 3 days | Discharge: HOME | End: 2022-08-05
Attending: SURGERY | Admitting: SURGERY
Payer: COMMERCIAL

## 2022-08-02 DIAGNOSIS — Z98.890: ICD-10-CM

## 2022-08-02 DIAGNOSIS — Z20.822: ICD-10-CM

## 2022-08-02 DIAGNOSIS — E66.9: ICD-10-CM

## 2022-08-02 DIAGNOSIS — K80.12: Primary | ICD-10-CM

## 2022-08-02 DIAGNOSIS — I10: ICD-10-CM

## 2022-08-02 DIAGNOSIS — Z86.16: ICD-10-CM

## 2022-08-02 DIAGNOSIS — Z79.899: ICD-10-CM

## 2022-08-02 DIAGNOSIS — Z91.013: ICD-10-CM

## 2022-08-02 LAB
BUN SERPL-MCNC: 13 MG/DL (ref 7–18)
CHLORIDE SERPL-SCNC: 103 MMOL/L (ref 98–107)
CO2 SERPL-SCNC: 28.1 MMOL/L (ref 21–32)
EGFRCR SERPLBLD CKD-EPI 2021: 68 ML/MIN (ref 60–?)
GLUCOSE SERPL-MCNC: 104 MG/DL (ref 74–106)
LIPASE SERPL-CCNC: 110 U/L (ref 73–393)
POTASSIUM SERPL-SCNC: 3.6 MMOL/L (ref 3.5–5.1)
SODIUM SERPL-SCNC: 138 MMOL/L (ref 136–145)

## 2022-08-02 PROCEDURE — 87635 SARS-COV-2 COVID-19 AMP PRB: CPT

## 2022-08-02 PROCEDURE — 47562 LAPAROSCOPIC CHOLECYSTECTOMY: CPT

## 2022-08-02 PROCEDURE — 99285 EMERGENCY DEPT VISIT HI MDM: CPT

## 2022-08-02 PROCEDURE — G0378 HOSPITAL OBSERVATION PER HR: HCPCS

## 2022-08-02 PROCEDURE — 83605 ASSAY OF LACTIC ACID: CPT

## 2022-08-02 PROCEDURE — 96361 HYDRATE IV INFUSION ADD-ON: CPT

## 2022-08-02 PROCEDURE — 96375 TX/PRO/DX INJ NEW DRUG ADDON: CPT

## 2022-08-02 PROCEDURE — 96376 TX/PRO/DX INJ SAME DRUG ADON: CPT

## 2022-08-02 PROCEDURE — U0002 COVID-19 LAB TEST NON-CDC: HCPCS

## 2022-08-02 PROCEDURE — 85025 COMPLETE CBC W/AUTO DIFF WBC: CPT

## 2022-08-02 PROCEDURE — 80053 COMPREHEN METABOLIC PANEL: CPT

## 2022-08-02 PROCEDURE — 76705 ECHO EXAM OF ABDOMEN: CPT

## 2022-08-02 PROCEDURE — 84703 CHORIONIC GONADOTROPIN ASSAY: CPT

## 2022-08-02 PROCEDURE — 36415 COLL VENOUS BLD VENIPUNCTURE: CPT

## 2022-08-02 PROCEDURE — 96374 THER/PROPH/DIAG INJ IV PUSH: CPT

## 2022-08-02 PROCEDURE — 81003 URINALYSIS AUTO W/O SCOPE: CPT

## 2022-08-02 PROCEDURE — 74177 CT ABD & PELVIS W/CONTRAST: CPT

## 2022-08-02 PROCEDURE — 83690 ASSAY OF LIPASE: CPT

## 2022-08-03 RX ADMIN — LOSARTAN POTASSIUM AND HYDROCHLOROTHIAZIDE SCH TAB: 50; 12.5 TABLET, FILM COATED ORAL at 10:16

## 2022-08-04 RX ADMIN — HYDROCODONE BITARTRATE AND ACETAMINOPHEN PRN TAB: 5; 325 TABLET ORAL at 17:53

## 2022-08-04 RX ADMIN — LOSARTAN POTASSIUM AND HYDROCHLOROTHIAZIDE SCH TAB: 50; 12.5 TABLET, FILM COATED ORAL at 08:29

## 2022-08-05 VITALS — HEART RATE: 59 BPM | SYSTOLIC BLOOD PRESSURE: 168 MMHG | DIASTOLIC BLOOD PRESSURE: 99 MMHG

## 2022-08-05 RX ADMIN — HYDROCODONE BITARTRATE AND ACETAMINOPHEN PRN TAB: 5; 325 TABLET ORAL at 08:03
